# Patient Record
Sex: FEMALE | Race: WHITE | NOT HISPANIC OR LATINO | URBAN - METROPOLITAN AREA
[De-identification: names, ages, dates, MRNs, and addresses within clinical notes are randomized per-mention and may not be internally consistent; named-entity substitution may affect disease eponyms.]

---

## 2017-04-05 ENCOUNTER — IMPORTED ENCOUNTER (OUTPATIENT)
Dept: URBAN - METROPOLITAN AREA CLINIC 31 | Facility: CLINIC | Age: 62
End: 2017-04-05

## 2017-04-05 PROBLEM — H17.89: Noted: 2017-04-05

## 2017-04-05 PROBLEM — R51: Noted: 2017-04-05

## 2017-04-05 PROCEDURE — 92014 COMPRE OPH EXAM EST PT 1/>: CPT

## 2017-04-05 PROCEDURE — 92015 DETERMINE REFRACTIVE STATE: CPT

## 2019-07-02 NOTE — PATIENT DISCUSSION
Cataract symptoms i.e., glare, blur discussed. Pt to call if worsening vision or trouble with driving, TV, reading, ADL. UV precautions. Reviewed possibility of future cataract surgery after #s 1 and 2 evaluated by PCP and retina.

## 2019-07-10 NOTE — PATIENT DISCUSSION
VA loss after MRI, today pt can not see nasal aspect OS, OD full VF. defect maybe 2/2 ischemic event or mass.

## 2019-07-10 NOTE — PATIENT DISCUSSION
Based on the negative or unstable exam finding we discussed the possibility of decreasing the interval between exams.

## 2019-07-10 NOTE — PATIENT DISCUSSION
Will check MRI Brain/Orbit w/ w/o Contrast to rule out Ischemic event, but Pt refuses to have MRI. No Evidence of Jaw Pain/ Or head pain.

## 2021-07-26 NOTE — PATIENT DISCUSSION
1.  S/P  Lasik:  2002 Lexus   Had a MONO result. but has been regressing. 2.  Headache of non-ocular origin - Patient has headache that cannot be explained by ocular exam and testing. Pt feels it is from holding her neck back for progressive. or Tension. Continue to follow with managing medical practitioner. 3. Change prog to BF and seeif dist and reading btter. Pt knows they will not be good for computer. Pt will pu OTc 1.50 or 1.75 for computer to see if helps and if she likes we ill put her brian rx in a pr of computer glasses. 4.  Pt has rx suns for boat. 5.   RTN 1 yr CE  SP [FreeTextEntry1] : SAMAN 1:1280\par centromere >8 \par scl70/rna polymerase negative \par negative Rf/CCP/ANCA/MPO/PR3\par SPEP/LDH normal\par UA normal\par ACE normal\par cpk normal\par tsh normal\par TPO/TG negative \par cbc/cmp normal

## 2021-12-20 ENCOUNTER — IMPORTED ENCOUNTER (OUTPATIENT)
Dept: URBAN - METROPOLITAN AREA CLINIC 31 | Facility: CLINIC | Age: 66
End: 2021-12-20

## 2021-12-20 PROBLEM — H17.89: Noted: 2021-12-20

## 2021-12-20 PROBLEM — E11.9: Noted: 2021-12-20

## 2021-12-20 PROBLEM — R51: Noted: 2021-12-20

## 2021-12-20 PROCEDURE — 99204 OFFICE O/P NEW MOD 45 MIN: CPT

## 2021-12-20 PROCEDURE — 92015 DETERMINE REFRACTIVE STATE: CPT

## 2021-12-20 NOTE — PATIENT DISCUSSION
1. DM II without sign of Diabetic Retinopathy OU:    A1C  was 13 -2 weeks ago. --  PT changed her dist and getting recheck in 3 mths-- Discussed the pathophysiology of diabetes and its effect on the eye. Stressed the importance of regular followup and good control of BS BP and Lipids to avoid future complications. 2. S/P  Lasik:  2002 Lexus   Had a MONO result. but has been regressing. 3.  Headache of non-ocular origin - Patient has headache that cannot be explained by ocular exam and testing. Pt feels it is from holding her neck back for progressive. or Tension. Continue to follow with managing medical practitioner. 4. Pt agreed to use her computer rx for driving and pu 7.01 temp for reading to get by until her sugars are mnore stable--  5. Defer rx until 2 mths for new mrx. 6. RTN 2 mths MRX7.   RTN 1 yr CE

## 2021-12-20 NOTE — PATIENT DISCUSSION
1.  S/P  Lasik:  2002 Lexus   Had a MONO result. but has been regressing. 2.  Headache of non-ocular origin - Patient has headache that cannot be explained by ocular exam and testing. Pt feels it is from holding her neck back for progressive. or Tension. Continue to follow with managing medical practitioner. 3. Change prog to BF and seeif dist and reading btter. Pt knows they will not be good for computer. Pt will pu OTc 1.50 or 1.75 for computer to see if helps and if she likes we ill put her brian rx in a pr of computer glasses. 4.  Pt has rx suns for boat. 5.   RTN 1 yr CE  SP

## 2022-01-31 ENCOUNTER — IMPORTED ENCOUNTER (OUTPATIENT)
Dept: URBAN - METROPOLITAN AREA CLINIC 31 | Facility: CLINIC | Age: 67
End: 2022-01-31

## 2022-01-31 PROBLEM — R51: Noted: 2022-01-31

## 2022-01-31 PROBLEM — E11.9: Noted: 2022-01-31

## 2022-01-31 PROBLEM — Z98.890: Noted: 2022-01-31

## 2022-01-31 PROBLEM — R51.9: Noted: 2022-01-31

## 2022-01-31 PROBLEM — H17.89: Noted: 2022-01-31

## 2022-01-31 PROCEDURE — 92015 DETERMINE REFRACTIVE STATE: CPT

## 2022-01-31 NOTE — PATIENT DISCUSSION
1. DM II without sign of Diabetic Retinopathy OU:    A1C  was 13 -2 weeks ago. --  PT changed her dist and getting recheck in 3 mths-- Discussed the pathophysiology of diabetes and its effect on the eye. Stressed the importance of regular followup and good control of BS BP and Lipids to avoid future complications. 2. S/P  Lasik:  2002 Lexus   Had a MONO result. but has been regressing. 3.  Headache of non-ocular origin - Patient has headache that cannot be explained by ocular exam and testing. Pt feels it is from holding her neck back for progressive. or Tension. Continue to follow with managing medical practitioner. 4. Pt agreed to use her computer rx for driving and pu 3.51 temp for reading to get by until her sugars are mnore stable--  5. Defer rx until 2 mths for new mrx. 6. RTN 2 mths MRX7.   RTN 1 yr CE

## 2022-04-02 ASSESSMENT — VISUAL ACUITY
OD_SC: 20/70
OD_PH: CC 20/40
OD_CC: 20/100
OS_SC: 20/25
OD_SC: 20/20-2
OS_SC: 20/70
OS_PH: CC 20/40 -2
OS_CC: 20/100

## 2022-04-02 ASSESSMENT — TONOMETRY
OS_IOP_MMHG: 16
OD_IOP_MMHG: 12
OS_IOP_MMHG: 14
OD_IOP_MMHG: 14
OD_IOP_MMHG: 14
OS_IOP_MMHG: 14

## 2024-04-22 ENCOUNTER — COMPREHENSIVE EXAM (OUTPATIENT)
Dept: URBAN - METROPOLITAN AREA CLINIC 34 | Facility: CLINIC | Age: 69
End: 2024-04-22

## 2024-04-22 DIAGNOSIS — H52.4: ICD-10-CM

## 2024-04-22 DIAGNOSIS — E11.9: ICD-10-CM

## 2024-04-22 PROCEDURE — 92015 DETERMINE REFRACTIVE STATE: CPT

## 2024-04-22 PROCEDURE — 92014 COMPRE OPH EXAM EST PT 1/>: CPT

## 2024-04-22 ASSESSMENT — TONOMETRY
OD_IOP_MMHG: 13
OS_IOP_MMHG: 14

## 2024-04-22 ASSESSMENT — VISUAL ACUITY
OD_CC: 20/25
OS_CC: J1+
OS_CC: 20/25
OD_CC: J1+